# Patient Record
Sex: FEMALE | Race: BLACK OR AFRICAN AMERICAN | NOT HISPANIC OR LATINO | URBAN - METROPOLITAN AREA
[De-identification: names, ages, dates, MRNs, and addresses within clinical notes are randomized per-mention and may not be internally consistent; named-entity substitution may affect disease eponyms.]

---

## 2018-07-01 ENCOUNTER — OUTPATIENT (OUTPATIENT)
Dept: OUTPATIENT SERVICES | Facility: HOSPITAL | Age: 33
LOS: 1 days | End: 2018-07-01
Payer: MEDICAID

## 2018-07-18 DIAGNOSIS — Z71.89 OTHER SPECIFIED COUNSELING: ICD-10-CM

## 2018-08-03 ENCOUNTER — EMERGENCY (EMERGENCY)
Facility: HOSPITAL | Age: 33
LOS: 1 days | Discharge: HOME | End: 2018-08-03
Attending: EMERGENCY MEDICINE | Admitting: EMERGENCY MEDICINE
Payer: MEDICAID

## 2018-08-03 VITALS
TEMPERATURE: 98 F | SYSTOLIC BLOOD PRESSURE: 116 MMHG | DIASTOLIC BLOOD PRESSURE: 78 MMHG | OXYGEN SATURATION: 100 % | HEART RATE: 94 BPM | RESPIRATION RATE: 16 BRPM

## 2018-08-03 LAB
ALBUMIN SERPL ELPH-MCNC: 3.9 G/DL — SIGNIFICANT CHANGE UP (ref 3.3–5)
ALP SERPL-CCNC: 100 U/L — SIGNIFICANT CHANGE UP (ref 40–120)
ALT FLD-CCNC: 10 U/L — SIGNIFICANT CHANGE UP (ref 4–33)
APPEARANCE UR: CLEAR — SIGNIFICANT CHANGE UP
AST SERPL-CCNC: 18 U/L — SIGNIFICANT CHANGE UP (ref 4–32)
BASE EXCESS BLDV CALC-SCNC: 1.5 MMOL/L — SIGNIFICANT CHANGE UP
BASOPHILS # BLD AUTO: 0.03 K/UL — SIGNIFICANT CHANGE UP (ref 0–0.2)
BASOPHILS NFR BLD AUTO: 0.4 % — SIGNIFICANT CHANGE UP (ref 0–2)
BILIRUB SERPL-MCNC: 0.5 MG/DL — SIGNIFICANT CHANGE UP (ref 0.2–1.2)
BILIRUB UR-MCNC: NEGATIVE — SIGNIFICANT CHANGE UP
BLOOD GAS VENOUS - CREATININE: 0.62 MG/DL — SIGNIFICANT CHANGE UP (ref 0.5–1.3)
BLOOD UR QL VISUAL: NEGATIVE — SIGNIFICANT CHANGE UP
BUN SERPL-MCNC: 11 MG/DL — SIGNIFICANT CHANGE UP (ref 7–23)
CALCIUM SERPL-MCNC: 9.1 MG/DL — SIGNIFICANT CHANGE UP (ref 8.4–10.5)
CHLORIDE BLDV-SCNC: 103 MMOL/L — SIGNIFICANT CHANGE UP (ref 96–108)
CHLORIDE SERPL-SCNC: 100 MMOL/L — SIGNIFICANT CHANGE UP (ref 98–107)
CO2 SERPL-SCNC: 22 MMOL/L — SIGNIFICANT CHANGE UP (ref 22–31)
COLOR SPEC: YELLOW — SIGNIFICANT CHANGE UP
CREAT SERPL-MCNC: 0.72 MG/DL — SIGNIFICANT CHANGE UP (ref 0.5–1.3)
EOSINOPHIL # BLD AUTO: 0.08 K/UL — SIGNIFICANT CHANGE UP (ref 0–0.5)
EOSINOPHIL NFR BLD AUTO: 1 % — SIGNIFICANT CHANGE UP (ref 0–6)
GAS PNL BLDV: 132 MMOL/L — LOW (ref 136–146)
GLUCOSE BLDV-MCNC: 223 — HIGH (ref 70–99)
GLUCOSE SERPL-MCNC: 224 MG/DL — HIGH (ref 70–99)
GLUCOSE UR-MCNC: 100 — SIGNIFICANT CHANGE UP
HCO3 BLDV-SCNC: 24 MMOL/L — SIGNIFICANT CHANGE UP (ref 20–27)
HCT VFR BLD CALC: 38.7 % — SIGNIFICANT CHANGE UP (ref 34.5–45)
HCT VFR BLDV CALC: 40.2 % — SIGNIFICANT CHANGE UP (ref 34.5–45)
HGB BLD-MCNC: 12.3 G/DL — SIGNIFICANT CHANGE UP (ref 11.5–15.5)
HGB BLDV-MCNC: 13.1 G/DL — SIGNIFICANT CHANGE UP (ref 11.5–15.5)
IMM GRANULOCYTES # BLD AUTO: 0.01 # — SIGNIFICANT CHANGE UP
IMM GRANULOCYTES NFR BLD AUTO: 0.1 % — SIGNIFICANT CHANGE UP (ref 0–1.5)
KETONES UR-MCNC: SIGNIFICANT CHANGE UP
LACTATE BLDV-MCNC: 0.7 MMOL/L — SIGNIFICANT CHANGE UP (ref 0.5–2)
LEUKOCYTE ESTERASE UR-ACNC: SIGNIFICANT CHANGE UP
LYMPHOCYTES # BLD AUTO: 3.83 K/UL — HIGH (ref 1–3.3)
LYMPHOCYTES # BLD AUTO: 49.4 % — HIGH (ref 13–44)
MCHC RBC-ENTMCNC: 26.8 PG — LOW (ref 27–34)
MCHC RBC-ENTMCNC: 31.8 % — LOW (ref 32–36)
MCV RBC AUTO: 84.3 FL — SIGNIFICANT CHANGE UP (ref 80–100)
MONOCYTES # BLD AUTO: 0.41 K/UL — SIGNIFICANT CHANGE UP (ref 0–0.9)
MONOCYTES NFR BLD AUTO: 5.3 % — SIGNIFICANT CHANGE UP (ref 2–14)
MUCOUS THREADS # UR AUTO: SIGNIFICANT CHANGE UP
NEUTROPHILS # BLD AUTO: 3.4 K/UL — SIGNIFICANT CHANGE UP (ref 1.8–7.4)
NEUTROPHILS NFR BLD AUTO: 43.8 % — SIGNIFICANT CHANGE UP (ref 43–77)
NITRITE UR-MCNC: NEGATIVE — SIGNIFICANT CHANGE UP
NRBC # FLD: 0 — SIGNIFICANT CHANGE UP
PCO2 BLDV: 48 MMHG — SIGNIFICANT CHANGE UP (ref 41–51)
PH BLDV: 7.36 PH — SIGNIFICANT CHANGE UP (ref 7.32–7.43)
PH UR: 6 — SIGNIFICANT CHANGE UP (ref 4.6–8)
PLATELET # BLD AUTO: 235 K/UL — SIGNIFICANT CHANGE UP (ref 150–400)
PMV BLD: 9.8 FL — SIGNIFICANT CHANGE UP (ref 7–13)
PO2 BLDV: 21 MMHG — LOW (ref 35–40)
POTASSIUM BLDV-SCNC: 3.8 MMOL/L — SIGNIFICANT CHANGE UP (ref 3.4–4.5)
POTASSIUM SERPL-MCNC: 4.1 MMOL/L — SIGNIFICANT CHANGE UP (ref 3.5–5.3)
POTASSIUM SERPL-SCNC: 4.1 MMOL/L — SIGNIFICANT CHANGE UP (ref 3.5–5.3)
PROT SERPL-MCNC: 7.5 G/DL — SIGNIFICANT CHANGE UP (ref 6–8.3)
PROT UR-MCNC: 30 MG/DL — HIGH
RBC # BLD: 4.59 M/UL — SIGNIFICANT CHANGE UP (ref 3.8–5.2)
RBC # FLD: 12.2 % — SIGNIFICANT CHANGE UP (ref 10.3–14.5)
RBC CASTS # UR COMP ASSIST: SIGNIFICANT CHANGE UP (ref 0–?)
SAO2 % BLDV: 28.1 % — LOW (ref 60–85)
SODIUM SERPL-SCNC: 135 MMOL/L — SIGNIFICANT CHANGE UP (ref 135–145)
SP GR SPEC: 1.02 — SIGNIFICANT CHANGE UP (ref 1–1.04)
SQUAMOUS # UR AUTO: SIGNIFICANT CHANGE UP
UROBILINOGEN FLD QL: 2 MG/DL — HIGH
WBC # BLD: 7.76 K/UL — SIGNIFICANT CHANGE UP (ref 3.8–10.5)
WBC # FLD AUTO: 7.76 K/UL — SIGNIFICANT CHANGE UP (ref 3.8–10.5)
WBC UR QL: HIGH (ref 0–?)

## 2018-08-03 PROCEDURE — 99284 EMERGENCY DEPT VISIT MOD MDM: CPT | Mod: 25

## 2018-08-03 RX ORDER — SODIUM CHLORIDE 9 MG/ML
1000 INJECTION INTRAMUSCULAR; INTRAVENOUS; SUBCUTANEOUS ONCE
Qty: 0 | Refills: 0 | Status: COMPLETED | OUTPATIENT
Start: 2018-08-03 | End: 2018-08-03

## 2018-08-03 NOTE — ED ADULT NURSE NOTE - OBJECTIVE STATEMENT
Pt received to TRA. Pt received to TRA. Pt states she has been experiencing constipation for a few days; went to her local urgent care center for same and was told her finger stick was over 300 and that she should go to the ER for further evaluation. Pt denies pain; appears very comfortable. IV placed (22g in RAC) and labs as ordered. Pt requests oral PO hydration; pt provided with 1L of H2O in lieu of IV.

## 2018-08-03 NOTE — ED PROVIDER NOTE - MEDICAL DECISION MAKING DETAILS
asx hyperglycemia.  No obvious source of infx or other provocation on exam or hx.  will check labs, give anticipatory guidance, & dc to outpt f/u

## 2018-08-03 NOTE — ED PROVIDER NOTE - OBJECTIVE STATEMENT
33F hx DM2 on metformin & glipizine now sent from Urgent Care for glucose level 354.  ALso st she is constipated.  Earlier today pt has nausea & abd pain after no BM for 2d, they found elevated sugar & sent to Emergency Department.  Pt has no sx on Emergency Department evaluation. Pt denied fvr/chills, HA, visual changes, dizziness, light headedness, presyncope, LOC, CP, PYLE, PND, SOB, cough, hemoptysis, v/d, changes in bowel or bladder habits, LE edema, numbness, weakness, changes in gait or rashes  The pt also denied recent travel outside of the country, recent illnesses, sick contacts, recent airplane trips or periods of immobility/bedbound. No recent changes to pt's medication hx.  No new exposures.

## 2018-08-03 NOTE — ED ADULT NURSE NOTE - EXTENSIONS OF SELF_ADULT
I assume primary medical responsibility for this patient, I have reviewed the case history, findings, diagnosis and treatment plan with the resident and agree that the care is reasonable and necessary. This service has been performed by a resident without the presence of a teaching physician under the primary care exception.  See below addendum for my evaluation and additional findings.       None

## 2018-08-03 NOTE — ED PROVIDER NOTE - ATTENDING CONTRIBUTION TO CARE
34 y/o F with h/o asthma, DM on oral meds here with hyperglycemia.  Pt reports that for the past 3 days she has been feeling constipated with mild associated nausea.  She went to urgent care earlier this evenig for these sxs and noted to be hyperglycemic to 350s, sent to ER for evaluation.  Pt reports she is compliant with medications.  No fever, chills, vomiting, change in appetite, abd pain or distension, urinary sxs.  Well appearing, lying comfortably in stretcher, awake and alert, nontoxic.  VSS.  Lungs cta bl.  Cards nl S1/S2, RRR, no MRG.  Abd soft ntnd.  No CVA tenderness.  Asymptomatic hyperglycemia, will give oral fluids, check labs, ucg, r/o acidosis reassess. Abd is benign on exam, no concern for intraabd pathology, will rec diet changes, miralax for constipation.

## 2018-08-03 NOTE — ED PROVIDER NOTE - CARE PLAN
Principal Discharge DX:	Hyperglycemia Principal Discharge DX:	Hyperglycemia  Assessment and plan of treatment:	-- Please follow up with your doctor(s) within the next 3 days, but seek medical care sooner if your symptoms worsen. Call for an appointment as soon as possible.  -- You were given results from any tests performed in the Emergency Department which have results available. Show these to your doctor(s). Some of the tests we sent may not have results yet so please call or have your doctor call the Emergency Department to follow up on all results.  -- If you have worsening or concerning symptoms, see your doctor right away or return to the Emergency Department immediately.  -- Please continue taking your home medications as directed. Do not take extra doses of medication to make up for missed doses. Don't use alcohol when taking any medication (especially antibiotics, Tylenol or pain medication) unless you check with a doctor/pharmacist.

## 2018-08-03 NOTE — ED PROVIDER NOTE - PLAN OF CARE
-- Please follow up with your doctor(s) within the next 3 days, but seek medical care sooner if your symptoms worsen. Call for an appointment as soon as possible.  -- You were given results from any tests performed in the Emergency Department which have results available. Show these to your doctor(s). Some of the tests we sent may not have results yet so please call or have your doctor call the Emergency Department to follow up on all results.  -- If you have worsening or concerning symptoms, see your doctor right away or return to the Emergency Department immediately.  -- Please continue taking your home medications as directed. Do not take extra doses of medication to make up for missed doses. Don't use alcohol when taking any medication (especially antibiotics, Tylenol or pain medication) unless you check with a doctor/pharmacist.

## 2018-08-03 NOTE — ED PROVIDER NOTE - PMH
Asthma, unspecified asthma severity, uncomplicated    Bartholin cyst    Diabetes  type 2  Gestational diabetes mellitus, unspecified diabetic control, unspecified trimester    Spontaneous

## 2018-08-03 NOTE — ED ADULT TRIAGE NOTE - CHIEF COMPLAINT QUOTE
Pt sent from Urgent Care for glucose level 354 and constipation. Went to urgent care for back pain, nausea, constipation for 2 days. Denies nausea at this time.  in triage. PMHx diabetes. Pt appears in NAD.

## 2018-08-03 NOTE — ED ADULT NURSE NOTE - NSIMPLEMENTINTERV_GEN_ALL_ED
Implemented All Universal Safety Interventions:  [place] to call system. Call bell, personal items and telephone within reach. Instruct patient to call for assistance. Room bathroom lighting operational. Non-slip footwear when patient is off stretcher. Physically safe environment: no spills, clutter or unnecessary equipment. Stretcher in lowest position, wheels locked, appropriate side rails in place.

## 2018-08-03 NOTE — ED PROVIDER NOTE - PROGRESS NOTE DETAILS
Zvi Dubin, MD (pgy-[id number]) note: Pt seen & reassessed.  Pt symptomatically improved.   A&Ox3, NAD, VSS, pt tolerated PO & ambulated w/steady unassisted gait in the ED.  We discussed the results of ED w/u w/patient & family @bedside, presumptive Emergency Department dx, associated anticipatory guidance (incl return precautions), & gave them a copy of results.  Patient verbalized understanding of hospital course & f/u plans, has decisional making capacity.  Pt st they will f/u w/PMD within the next 3 days; pt agrees to call today or tomorrow for an appointment. Pt agrees to return to the ED if there is any worsening or concerning symptoms.

## 2018-12-01 PROCEDURE — G9005: CPT

## 2023-02-27 NOTE — ED ADULT NURSE NOTE - IN THE PAST YEAR, HOW OFTEN HAVE YOU USED TOBACCO PRODUCTS?
Never Hydroxychloroquine Pregnancy And Lactation Text: This medication has been shown to cause fetal harm but it isn't assigned a Pregnancy Risk Category. There are small amounts excreted in breast milk.